# Patient Record
Sex: FEMALE | Race: WHITE | NOT HISPANIC OR LATINO | Employment: OTHER | URBAN - METROPOLITAN AREA
[De-identification: names, ages, dates, MRNs, and addresses within clinical notes are randomized per-mention and may not be internally consistent; named-entity substitution may affect disease eponyms.]

---

## 2023-03-29 LAB
ALANINE AMINOTRANSFERASE (SGPT) (U/L) IN SER/PLAS: 9 U/L (ref 7–45)
ALBUMIN (G/DL) IN SER/PLAS: 4.6 G/DL (ref 3.4–5)
ALKALINE PHOSPHATASE (U/L) IN SER/PLAS: 77 U/L (ref 33–136)
ANION GAP IN SER/PLAS: 13 MMOL/L (ref 10–20)
ASPARTATE AMINOTRANSFERASE (SGOT) (U/L) IN SER/PLAS: 11 U/L (ref 9–39)
BILIRUBIN TOTAL (MG/DL) IN SER/PLAS: 0.5 MG/DL (ref 0–1.2)
CALCIDIOL (25 OH VITAMIN D3) (NG/ML) IN SER/PLAS: 60 NG/ML
CALCIUM (MG/DL) IN SER/PLAS: 9.8 MG/DL (ref 8.6–10.3)
CARBON DIOXIDE, TOTAL (MMOL/L) IN SER/PLAS: 27 MMOL/L (ref 21–32)
CHLORIDE (MMOL/L) IN SER/PLAS: 105 MMOL/L (ref 98–107)
CHOLESTEROL (MG/DL) IN SER/PLAS: 185 MG/DL (ref 0–199)
CHOLESTEROL IN HDL (MG/DL) IN SER/PLAS: 69.3 MG/DL
CHOLESTEROL/HDL RATIO: 2.7
CREATININE (MG/DL) IN SER/PLAS: 1.09 MG/DL (ref 0.5–1.05)
ERYTHROCYTE DISTRIBUTION WIDTH (RATIO) BY AUTOMATED COUNT: 15.3 % (ref 11.5–14.5)
ERYTHROCYTE MEAN CORPUSCULAR HEMOGLOBIN CONCENTRATION (G/DL) BY AUTOMATED: 30.5 G/DL (ref 32–36)
ERYTHROCYTE MEAN CORPUSCULAR VOLUME (FL) BY AUTOMATED COUNT: 86 FL (ref 80–100)
ERYTHROCYTES (10*6/UL) IN BLOOD BY AUTOMATED COUNT: 5.13 X10E12/L (ref 4–5.2)
GFR FEMALE: 53 ML/MIN/1.73M2
GLUCOSE (MG/DL) IN SER/PLAS: 106 MG/DL (ref 74–99)
HEMATOCRIT (%) IN BLOOD BY AUTOMATED COUNT: 44 % (ref 36–46)
HEMOGLOBIN (G/DL) IN BLOOD: 13.4 G/DL (ref 12–16)
LDL: 101 MG/DL (ref 0–99)
LEUKOCYTES (10*3/UL) IN BLOOD BY AUTOMATED COUNT: 9.6 X10E9/L (ref 4.4–11.3)
NRBC (PER 100 WBCS) BY AUTOMATED COUNT: 0 /100 WBC (ref 0–0)
PLATELETS (10*3/UL) IN BLOOD AUTOMATED COUNT: 283 X10E9/L (ref 150–450)
POTASSIUM (MMOL/L) IN SER/PLAS: 4.6 MMOL/L (ref 3.5–5.3)
PROTEIN TOTAL: 7 G/DL (ref 6.4–8.2)
SODIUM (MMOL/L) IN SER/PLAS: 140 MMOL/L (ref 136–145)
THYROTROPIN (MIU/L) IN SER/PLAS BY DETECTION LIMIT <= 0.05 MIU/L: 2.91 MIU/L (ref 0.44–3.98)
TRIGLYCERIDE (MG/DL) IN SER/PLAS: 74 MG/DL (ref 0–149)
UREA NITROGEN (MG/DL) IN SER/PLAS: 15 MG/DL (ref 6–23)
VLDL: 15 MG/DL (ref 0–40)

## 2024-03-06 ENCOUNTER — HOSPITAL ENCOUNTER (OUTPATIENT)
Dept: RADIOLOGY | Facility: CLINIC | Age: 74
Discharge: HOME | End: 2024-03-06
Payer: MEDICARE

## 2024-03-06 DIAGNOSIS — F17.200 NICOTINE DEPENDENCE, UNSPECIFIED, UNCOMPLICATED: ICD-10-CM

## 2024-03-06 DIAGNOSIS — F17.210 CIGARETTE NICOTINE DEPENDENCE: ICD-10-CM

## 2024-03-06 PROCEDURE — 71271 CT THORAX LUNG CANCER SCR C-: CPT

## 2024-03-27 ENCOUNTER — OFFICE VISIT (OUTPATIENT)
Dept: OTOLARYNGOLOGY | Facility: CLINIC | Age: 74
End: 2024-03-27
Payer: MEDICARE

## 2024-03-27 VITALS — BODY MASS INDEX: 27 KG/M2 | WEIGHT: 150 LBS

## 2024-03-27 DIAGNOSIS — H83.3X2 NOISE-INDUCED HEARING LOSS OF LEFT EAR WITH RESTRICTED HEARING OF RIGHT EAR: Primary | ICD-10-CM

## 2024-03-27 DIAGNOSIS — H93.19 TINNITUS, UNSPECIFIED LATERALITY: ICD-10-CM

## 2024-03-27 PROCEDURE — 99203 OFFICE O/P NEW LOW 30 MIN: CPT | Performed by: GENERAL PRACTICE

## 2024-03-27 PROCEDURE — 4004F PT TOBACCO SCREEN RCVD TLK: CPT | Performed by: GENERAL PRACTICE

## 2024-03-27 PROCEDURE — 1159F MED LIST DOCD IN RCRD: CPT | Performed by: GENERAL PRACTICE

## 2024-03-27 RX ORDER — ATORVASTATIN CALCIUM 40 MG/1
40 TABLET, FILM COATED ORAL DAILY
COMMUNITY
Start: 2020-09-21

## 2024-03-27 RX ORDER — SOLIFENACIN SUCCINATE 5 MG/1
5 TABLET, FILM COATED ORAL DAILY
COMMUNITY
Start: 2024-02-29

## 2024-03-27 RX ORDER — ALBUTEROL SULFATE 90 UG/1
1 AEROSOL, METERED RESPIRATORY (INHALATION) EVERY 4 HOURS PRN
COMMUNITY

## 2024-03-27 RX ORDER — VIT C/E/ZN/COPPR/LUTEIN/ZEAXAN 250MG-90MG
25 CAPSULE ORAL DAILY
COMMUNITY
Start: 2022-07-22

## 2024-03-27 RX ORDER — FLUTICASONE PROPIONATE 50 MCG
SPRAY, SUSPENSION (ML) NASAL EVERY 12 HOURS
COMMUNITY
End: 2024-04-17 | Stop reason: ALTCHOICE

## 2024-03-27 RX ORDER — NAPROXEN SODIUM 220 MG/1
TABLET, FILM COATED ORAL
COMMUNITY
End: 2024-04-17 | Stop reason: ALTCHOICE

## 2024-03-27 NOTE — PROGRESS NOTES
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  Symptomatically improving.  No otitis on exam.  Discussed audio to eval.  Discussed possible steroid treatment if symptoms were not improving but pt declines for now.   Consider MRI of IACs for asymmetry depending on audio results.       Follow up:  -plan for follow up in clinic as needed and after completion of ordered studies    All of the above findings, impressions, treatment planning and follow up plans were discussed with the patient who indicated understanding.  the patient was instructed to contact or return to clinic sooner if symptoms/signs persist or worsen despite the above management.      Heri Pablo MD  Otolaryngology - Head and Neck Surgery            History Of Present Illness  Genevieve Parham is a 74 y.o. female presenting for concerns of left sided hearing change and tinnitus.     Reprots some baseline L>R hearing loss with audiogram at outside facility last year showing L>R hearing loss.  Hearing aids used briefly but poor performance and they were returned.    Reports recent noise exposure (music) on 3/18 with some L>R muffled hearing and nonpulsatile tinnitus since this time.  Improvement but not resolution since initially noted.    No significant history of recurrent otitis or ETD symptoms.     Reports a history of allergic rhinitis, none currently.   Reports some chronic sinus pressure/congestion and prior FESS x3, but no recurrent acute sinusitis in recent years.     Notes some history of mild  lightheadedness with rising rapidly, but no other significant vertigo.             Past Medical History  She has a past medical history of Personal history of other diseases of the circulatory system and Personal history of other medical treatment.    Surgical History  She has a past surgical history that includes Other surgical history (07/01/2019).     Social History  She reports that she has been smoking cigarettes. She has been smoking an average of 1 pack per day. She has never used smokeless tobacco. No history on file for alcohol use and drug use.    Family History  No family history on file.     Allergies  Patient has no known allergies.    Review of Systems  ROS: Pertinent positives as noted in HPI.    - CONSTITUTIONAL: Does not report weight loss, fever or chills.    - HEENT:   Ear: Does not report  ,  ,    , otalgia, otorrhea  Nose: Does not report congestion, rhinorrhea, epistaxis, decreased smell  Throat: Does not report pain, dysphagia, odynophagia  Larynx: Does not report hoarseness,  difficulty breathing, pain with speaking (odynophonia)  Neck: Does not report new masses, pain, swelling  Face: Does not report sinus pain, pressure, swelling, numbness, weakness     - RESPIRATORY: Does not report SOB or cough.    - CV: Does not report palpitations or chest pain.     - GI: Does not report abdominal pain, nausea, vomiting or diarrhea.    - : Does not report dysuria or urinary frequency.    - MSK: Does not report myalgia or joint pain.    - SKIN: Does not report rash or pruritus.    - NEUROLOGICAL: Does not report headache or syncope.    - PSYCHIATRIC: Does not report recent changes in mood. Does not report anxiety or depression.         Physical Exam:     GENERAL:   Alert & Oriented to person, place and time; Normal affect and appearance. Well developed and well nourished. Conversant & cooperative with examination.     HEAD:   Normocephalic, atraumatic. No sinus tenderness to palpation. Normal parotid  bilaterally. Normal facial strength.     NEUROLOGIC:   Cranial nerves II-XII grossly intact, gait WNL. Normal mood and affect.    EYES:   Extraocular movements intact. Pupils equal, round, reactive to light and accommodation. No nystagmus, no ptosis. no scleral injection.    EAR:   Normal auricle. No discomfort or TTP with manipulation.   Handheld otoscopic exam showed normal external auditory canals bilaterally. No purulence or EAC inflammation. Minimal cerumen.   Right tympanic membrane clear and mobile without evidence of perforation, retraction or middle ear effusion.   Left tympanic membrane clear and mobile without evidence of perforation, retraction or middle ear effusion.   Singh to R with 512hz and AC>BC b/l.       NOSE:   No external deformity. No external nasal lesions, lacerations, or scars. Nasal tip symmetrical with normal nasal valves.   Nasal cavity with essentially midline septum, normal mucosa and turbinates. No lesions, masses, purulence or polyps.     OC/OP:   Mucous membranes moist, no masses, lesions or exudates.   Normal tongue, floor of mouth, teeth, gums, lips. Normal posterior pharyngeal wall.    Normal tonsils without erythema, exudate or obvious calculi     NECK:   No neck masses or thyroid enlargement. Trachea midline. No tenderness to palpation    LYMPHATIC:   No cervical lymphadenopathy.     RESPIRATORY:   Symmetric chest elevation & no retractions. No significant hoarseness. No increased work of breathing.    CV:   No clubbing or cyanosis. No obvious edema    Skin:   No facial rashes, vesicles or lesions.     Extremities:   No gross abnormalities      Clinic Procedure        Information review:  External sources (notes, imaging, lab results) listed below personally reviewed to aid in medical decision making process.  -  -  -

## 2024-04-17 ENCOUNTER — APPOINTMENT (OUTPATIENT)
Dept: RADIOLOGY | Facility: HOSPITAL | Age: 74
End: 2024-04-17
Payer: MEDICARE

## 2024-04-17 ENCOUNTER — APPOINTMENT (OUTPATIENT)
Dept: CARDIOLOGY | Facility: HOSPITAL | Age: 74
End: 2024-04-17
Payer: MEDICARE

## 2024-04-17 ENCOUNTER — HOSPITAL ENCOUNTER (OUTPATIENT)
Facility: HOSPITAL | Age: 74
Setting detail: OBSERVATION
Discharge: HOME | End: 2024-04-18
Attending: STUDENT IN AN ORGANIZED HEALTH CARE EDUCATION/TRAINING PROGRAM | Admitting: INTERNAL MEDICINE
Payer: MEDICARE

## 2024-04-17 DIAGNOSIS — R07.9 CHEST PAIN, UNSPECIFIED TYPE: Primary | ICD-10-CM

## 2024-04-17 LAB
ALBUMIN SERPL BCP-MCNC: 4.3 G/DL (ref 3.4–5)
ALP SERPL-CCNC: 86 U/L (ref 33–136)
ALT SERPL W P-5'-P-CCNC: 9 U/L (ref 7–45)
ANION GAP BLDV CALCULATED.4IONS-SCNC: 12 MMOL/L (ref 10–25)
ANION GAP SERPL CALC-SCNC: 14 MMOL/L (ref 10–20)
AST SERPL W P-5'-P-CCNC: 11 U/L (ref 9–39)
BASE EXCESS BLDV CALC-SCNC: 1.1 MMOL/L (ref -2–3)
BASOPHILS # BLD AUTO: 0.08 X10*3/UL (ref 0–0.1)
BASOPHILS NFR BLD AUTO: 0.7 %
BILIRUB SERPL-MCNC: 0.6 MG/DL (ref 0–1.2)
BODY TEMPERATURE: 37 DEGREES CELSIUS
BUN SERPL-MCNC: 12 MG/DL (ref 6–23)
CA-I BLDV-SCNC: 1.23 MMOL/L (ref 1.1–1.33)
CALCIUM SERPL-MCNC: 9.9 MG/DL (ref 8.6–10.3)
CARDIAC TROPONIN I PNL SERPL HS: 5 NG/L (ref 0–13)
CARDIAC TROPONIN I PNL SERPL HS: 5 NG/L (ref 0–13)
CHLORIDE BLDV-SCNC: 103 MMOL/L (ref 98–107)
CHLORIDE SERPL-SCNC: 104 MMOL/L (ref 98–107)
CO2 SERPL-SCNC: 27 MMOL/L (ref 21–32)
CREAT SERPL-MCNC: 1.07 MG/DL (ref 0.5–1.05)
EGFRCR SERPLBLD CKD-EPI 2021: 54 ML/MIN/1.73M*2
EOSINOPHIL # BLD AUTO: 0.16 X10*3/UL (ref 0–0.4)
EOSINOPHIL NFR BLD AUTO: 1.5 %
ERYTHROCYTE [DISTWIDTH] IN BLOOD BY AUTOMATED COUNT: 13.8 % (ref 11.5–14.5)
FLUAV RNA RESP QL NAA+PROBE: NOT DETECTED
FLUBV RNA RESP QL NAA+PROBE: NOT DETECTED
GLUCOSE BLDV-MCNC: 105 MG/DL (ref 74–99)
GLUCOSE SERPL-MCNC: 101 MG/DL (ref 74–99)
HCO3 BLDV-SCNC: 27.6 MMOL/L (ref 22–26)
HCT VFR BLD AUTO: 45.4 % (ref 36–46)
HCT VFR BLD EST: 45 % (ref 36–46)
HGB BLD-MCNC: 15 G/DL (ref 12–16)
HGB BLDV-MCNC: 15 G/DL (ref 12–16)
IMM GRANULOCYTES # BLD AUTO: 0.03 X10*3/UL (ref 0–0.5)
IMM GRANULOCYTES NFR BLD AUTO: 0.3 % (ref 0–0.9)
INHALED O2 CONCENTRATION: 21 %
LACTATE BLDV-SCNC: 2.2 MMOL/L (ref 0.4–2)
LYMPHOCYTES # BLD AUTO: 2.97 X10*3/UL (ref 0.8–3)
LYMPHOCYTES NFR BLD AUTO: 27 %
MAGNESIUM SERPL-MCNC: 1.88 MG/DL (ref 1.6–2.4)
MCH RBC QN AUTO: 29.2 PG (ref 26–34)
MCHC RBC AUTO-ENTMCNC: 33 G/DL (ref 32–36)
MCV RBC AUTO: 89 FL (ref 80–100)
MONOCYTES # BLD AUTO: 0.86 X10*3/UL (ref 0.05–0.8)
MONOCYTES NFR BLD AUTO: 7.8 %
NEUTROPHILS # BLD AUTO: 6.91 X10*3/UL (ref 1.6–5.5)
NEUTROPHILS NFR BLD AUTO: 62.7 %
NRBC BLD-RTO: 0 /100 WBCS (ref 0–0)
OXYHGB MFR BLDV: 51.4 % (ref 45–75)
PCO2 BLDV: 50 MM HG (ref 41–51)
PH BLDV: 7.35 PH (ref 7.33–7.43)
PLATELET # BLD AUTO: 270 X10*3/UL (ref 150–450)
PO2 BLDV: 34 MM HG (ref 35–45)
POTASSIUM BLDV-SCNC: 4.3 MMOL/L (ref 3.5–5.3)
POTASSIUM SERPL-SCNC: 4.1 MMOL/L (ref 3.5–5.3)
PROT SERPL-MCNC: 6.9 G/DL (ref 6.4–8.2)
RBC # BLD AUTO: 5.13 X10*6/UL (ref 4–5.2)
SAO2 % BLDV: 56 % (ref 45–75)
SARS-COV-2 RNA RESP QL NAA+PROBE: NOT DETECTED
SODIUM BLDV-SCNC: 138 MMOL/L (ref 136–145)
SODIUM SERPL-SCNC: 141 MMOL/L (ref 136–145)
WBC # BLD AUTO: 11 X10*3/UL (ref 4.4–11.3)

## 2024-04-17 PROCEDURE — 84132 ASSAY OF SERUM POTASSIUM: CPT | Performed by: STUDENT IN AN ORGANIZED HEALTH CARE EDUCATION/TRAINING PROGRAM

## 2024-04-17 PROCEDURE — 2500000001 HC RX 250 WO HCPCS SELF ADMINISTERED DRUGS (ALT 637 FOR MEDICARE OP): Performed by: STUDENT IN AN ORGANIZED HEALTH CARE EDUCATION/TRAINING PROGRAM

## 2024-04-17 PROCEDURE — 84484 ASSAY OF TROPONIN QUANT: CPT | Performed by: STUDENT IN AN ORGANIZED HEALTH CARE EDUCATION/TRAINING PROGRAM

## 2024-04-17 PROCEDURE — 93005 ELECTROCARDIOGRAM TRACING: CPT

## 2024-04-17 PROCEDURE — 99285 EMERGENCY DEPT VISIT HI MDM: CPT | Mod: 25

## 2024-04-17 PROCEDURE — 2500000001 HC RX 250 WO HCPCS SELF ADMINISTERED DRUGS (ALT 637 FOR MEDICARE OP)

## 2024-04-17 PROCEDURE — 87636 SARSCOV2 & INF A&B AMP PRB: CPT | Performed by: STUDENT IN AN ORGANIZED HEALTH CARE EDUCATION/TRAINING PROGRAM

## 2024-04-17 PROCEDURE — 36415 COLL VENOUS BLD VENIPUNCTURE: CPT | Performed by: STUDENT IN AN ORGANIZED HEALTH CARE EDUCATION/TRAINING PROGRAM

## 2024-04-17 PROCEDURE — G0378 HOSPITAL OBSERVATION PER HR: HCPCS

## 2024-04-17 PROCEDURE — 71045 X-RAY EXAM CHEST 1 VIEW: CPT | Performed by: RADIOLOGY

## 2024-04-17 PROCEDURE — 2550000001 HC RX 255 CONTRASTS: Performed by: STUDENT IN AN ORGANIZED HEALTH CARE EDUCATION/TRAINING PROGRAM

## 2024-04-17 PROCEDURE — 2500000006 HC RX 250 W HCPCS SELF ADMINISTERED DRUGS (ALT 637 FOR ALL PAYERS)

## 2024-04-17 PROCEDURE — 93010 ELECTROCARDIOGRAM REPORT: CPT | Performed by: INTERNAL MEDICINE

## 2024-04-17 PROCEDURE — 71275 CT ANGIOGRAPHY CHEST: CPT | Performed by: RADIOLOGY

## 2024-04-17 PROCEDURE — 74174 CTA ABD&PLVS W/CONTRAST: CPT

## 2024-04-17 PROCEDURE — 83735 ASSAY OF MAGNESIUM: CPT | Performed by: STUDENT IN AN ORGANIZED HEALTH CARE EDUCATION/TRAINING PROGRAM

## 2024-04-17 PROCEDURE — 2500000006 HC RX 250 W HCPCS SELF ADMINISTERED DRUGS (ALT 637 FOR ALL PAYERS): Mod: MUE

## 2024-04-17 PROCEDURE — 85025 COMPLETE CBC W/AUTO DIFF WBC: CPT | Performed by: STUDENT IN AN ORGANIZED HEALTH CARE EDUCATION/TRAINING PROGRAM

## 2024-04-17 PROCEDURE — 74174 CTA ABD&PLVS W/CONTRAST: CPT | Performed by: RADIOLOGY

## 2024-04-17 PROCEDURE — 71045 X-RAY EXAM CHEST 1 VIEW: CPT

## 2024-04-17 PROCEDURE — 84132 ASSAY OF SERUM POTASSIUM: CPT | Mod: 59,91 | Performed by: STUDENT IN AN ORGANIZED HEALTH CARE EDUCATION/TRAINING PROGRAM

## 2024-04-17 RX ORDER — NAPROXEN SODIUM 220 MG/1
324 TABLET, FILM COATED ORAL ONCE
Status: COMPLETED | OUTPATIENT
Start: 2024-04-17 | End: 2024-04-17

## 2024-04-17 RX ORDER — OXYBUTYNIN CHLORIDE 5 MG/1
5 TABLET ORAL 2 TIMES DAILY
Status: DISCONTINUED | OUTPATIENT
Start: 2024-04-17 | End: 2024-04-18 | Stop reason: HOSPADM

## 2024-04-17 RX ORDER — NAPROXEN SODIUM 220 MG/1
81 TABLET, FILM COATED ORAL DAILY
Status: DISCONTINUED | OUTPATIENT
Start: 2024-04-18 | End: 2024-04-18 | Stop reason: HOSPADM

## 2024-04-17 RX ORDER — CLOPIDOGREL BISULFATE 75 MG/1
75 TABLET ORAL DAILY
COMMUNITY

## 2024-04-17 RX ORDER — ATORVASTATIN CALCIUM 40 MG/1
40 TABLET, FILM COATED ORAL NIGHTLY
Status: DISCONTINUED | OUTPATIENT
Start: 2024-04-17 | End: 2024-04-18 | Stop reason: HOSPADM

## 2024-04-17 RX ORDER — ENOXAPARIN SODIUM 100 MG/ML
40 INJECTION SUBCUTANEOUS EVERY 24 HOURS
Status: DISCONTINUED | OUTPATIENT
Start: 2024-04-17 | End: 2024-04-18 | Stop reason: HOSPADM

## 2024-04-17 RX ORDER — ONDANSETRON HYDROCHLORIDE 2 MG/ML
4 INJECTION, SOLUTION INTRAVENOUS ONCE
Status: DISCONTINUED | OUTPATIENT
Start: 2024-04-17 | End: 2024-04-18 | Stop reason: HOSPADM

## 2024-04-17 RX ORDER — TALC
6 POWDER (GRAM) TOPICAL NIGHTLY
Status: DISCONTINUED | OUTPATIENT
Start: 2024-04-17 | End: 2024-04-18 | Stop reason: HOSPADM

## 2024-04-17 RX ORDER — LANOLIN ALCOHOL/MO/W.PET/CERES
1000 CREAM (GRAM) TOPICAL DAILY
COMMUNITY

## 2024-04-17 RX ORDER — PANTOPRAZOLE SODIUM 40 MG/1
40 TABLET, DELAYED RELEASE ORAL
COMMUNITY

## 2024-04-17 RX ADMIN — ATORVASTATIN CALCIUM 40 MG: 40 TABLET, FILM COATED ORAL at 20:14

## 2024-04-17 RX ADMIN — ASPIRIN 81 MG CHEWABLE TABLET 324 MG: 81 TABLET CHEWABLE at 12:47

## 2024-04-17 RX ADMIN — IOHEXOL 75 ML: 350 INJECTION, SOLUTION INTRAVENOUS at 10:49

## 2024-04-17 RX ADMIN — OXYBUTYNIN CHLORIDE 5 MG: 5 TABLET ORAL at 20:14

## 2024-04-17 SDOH — SOCIAL STABILITY: SOCIAL INSECURITY: HAVE YOU HAD ANY THOUGHTS OF HARMING ANYONE ELSE?: NO

## 2024-04-17 SDOH — SOCIAL STABILITY: SOCIAL INSECURITY: HAVE YOU HAD THOUGHTS OF HARMING ANYONE ELSE?: NO

## 2024-04-17 SDOH — SOCIAL STABILITY: SOCIAL INSECURITY: DO YOU FEEL ANYONE HAS EXPLOITED OR TAKEN ADVANTAGE OF YOU FINANCIALLY OR OF YOUR PERSONAL PROPERTY?: NO

## 2024-04-17 SDOH — SOCIAL STABILITY: SOCIAL INSECURITY: WERE YOU ABLE TO COMPLETE ALL THE BEHAVIORAL HEALTH SCREENINGS?: YES

## 2024-04-17 SDOH — SOCIAL STABILITY: SOCIAL INSECURITY: ARE THERE ANY APPARENT SIGNS OF INJURIES/BEHAVIORS THAT COULD BE RELATED TO ABUSE/NEGLECT?: NO

## 2024-04-17 SDOH — SOCIAL STABILITY: SOCIAL INSECURITY: DOES ANYONE TRY TO KEEP YOU FROM HAVING/CONTACTING OTHER FRIENDS OR DOING THINGS OUTSIDE YOUR HOME?: NO

## 2024-04-17 SDOH — SOCIAL STABILITY: SOCIAL INSECURITY: DO YOU FEEL UNSAFE GOING BACK TO THE PLACE WHERE YOU ARE LIVING?: NO

## 2024-04-17 SDOH — SOCIAL STABILITY: SOCIAL INSECURITY: ABUSE: ADULT

## 2024-04-17 SDOH — SOCIAL STABILITY: SOCIAL INSECURITY: HAS ANYONE EVER THREATENED TO HURT YOUR FAMILY OR YOUR PETS?: NO

## 2024-04-17 SDOH — SOCIAL STABILITY: SOCIAL INSECURITY: ARE YOU OR HAVE YOU BEEN THREATENED OR ABUSED PHYSICALLY, EMOTIONALLY, OR SEXUALLY BY ANYONE?: NO

## 2024-04-17 ASSESSMENT — COGNITIVE AND FUNCTIONAL STATUS - GENERAL
WALKING IN HOSPITAL ROOM: A LITTLE
PATIENT BASELINE BEDBOUND: NO
DAILY ACTIVITIY SCORE: 24
MOBILITY SCORE: 22
CLIMB 3 TO 5 STEPS WITH RAILING: A LITTLE

## 2024-04-17 ASSESSMENT — ACTIVITIES OF DAILY LIVING (ADL)
LACK_OF_TRANSPORTATION: NO
TOILETING: INDEPENDENT
BATHING: INDEPENDENT
DRESSING YOURSELF: INDEPENDENT
HEARING - RIGHT EAR: FUNCTIONAL
ADEQUATE_TO_COMPLETE_ADL: YES
JUDGMENT_ADEQUATE_SAFELY_COMPLETE_DAILY_ACTIVITIES: YES
HEARING - LEFT EAR: FUNCTIONAL
WALKS IN HOME: INDEPENDENT
PATIENT'S MEMORY ADEQUATE TO SAFELY COMPLETE DAILY ACTIVITIES?: YES
FEEDING YOURSELF: INDEPENDENT
GROOMING: INDEPENDENT

## 2024-04-17 ASSESSMENT — PAIN - FUNCTIONAL ASSESSMENT
PAIN_FUNCTIONAL_ASSESSMENT: 0-10
PAIN_FUNCTIONAL_ASSESSMENT: 0-10

## 2024-04-17 ASSESSMENT — PATIENT HEALTH QUESTIONNAIRE - PHQ9
2. FEELING DOWN, DEPRESSED OR HOPELESS: NOT AT ALL
1. LITTLE INTEREST OR PLEASURE IN DOING THINGS: NOT AT ALL
SUM OF ALL RESPONSES TO PHQ9 QUESTIONS 1 & 2: 0

## 2024-04-17 ASSESSMENT — LIFESTYLE VARIABLES
HOW MANY STANDARD DRINKS CONTAINING ALCOHOL DO YOU HAVE ON A TYPICAL DAY: PATIENT DOES NOT DRINK
AUDIT-C TOTAL SCORE: 0
PRESCIPTION_ABUSE_PAST_12_MONTHS: NO
SKIP TO QUESTIONS 9-10: 1
HOW OFTEN DO YOU HAVE A DRINK CONTAINING ALCOHOL: NEVER
HOW OFTEN DO YOU HAVE 6 OR MORE DRINKS ON ONE OCCASION: NEVER
SUBSTANCE_ABUSE_PAST_12_MONTHS: NO
AUDIT-C TOTAL SCORE: 0

## 2024-04-17 ASSESSMENT — COLUMBIA-SUICIDE SEVERITY RATING SCALE - C-SSRS
1. IN THE PAST MONTH, HAVE YOU WISHED YOU WERE DEAD OR WISHED YOU COULD GO TO SLEEP AND NOT WAKE UP?: NO
6. HAVE YOU EVER DONE ANYTHING, STARTED TO DO ANYTHING, OR PREPARED TO DO ANYTHING TO END YOUR LIFE?: NO
2. HAVE YOU ACTUALLY HAD ANY THOUGHTS OF KILLING YOURSELF?: NO

## 2024-04-17 ASSESSMENT — PAIN SCALES - GENERAL
PAINLEVEL_OUTOF10: 2
PAINLEVEL_OUTOF10: 1
PAINLEVEL_OUTOF10: 0 - NO PAIN
PAINLEVEL_OUTOF10: 0 - NO PAIN

## 2024-04-17 ASSESSMENT — PAIN DESCRIPTION - LOCATION
LOCATION: CHEST
LOCATION: CHEST

## 2024-04-17 NOTE — ED PROVIDER NOTES
HPI   Chief Complaint   Patient presents with    Chest Pain       This is a 74-year-old female with past medical history that includes CAD status post 3 stents presenting to the emergency department for chest pain.  Patient was in the waiting room after bringing a friend to the emergency department when she started developing chest pain.  States this was pressure/crampy pain sternally.  She felt lightheaded and fainted.  Patient was brought back in a wheelchair and had another episode of fainting.  She currently feels nauseous though the chest pain has started to improve.  States she has had episodes like this in the past and never lost consciousness.  She normally takes nitro at home which causes the pain to go away.  She otherwise denies any recent symptoms including headaches, vision changes, shortness of breath, abdominal pain, back pain, urinary symptoms, lower extremity pain or swelling.      History provided by:  Patient   used: No                        Awa Coma Scale Score: 15                     Patient History   Past Medical History:   Diagnosis Date    Personal history of other diseases of the circulatory system     History of hypertension    Personal history of other medical treatment     History of echocardiogram     Past Surgical History:   Procedure Laterality Date    OTHER SURGICAL HISTORY  07/01/2019    Cardiac catheterization with stent placement     No family history on file.  Social History     Tobacco Use    Smoking status: Every Day     Current packs/day: 1.00     Types: Cigarettes    Smokeless tobacco: Never   Substance Use Topics    Alcohol use: Not on file    Drug use: Not on file       Physical Exam   ED Triage Vitals [04/17/24 1021]   Temp Heart Rate Resp BP   -- 74 -- 155/76      Pulse Ox Temp src Heart Rate Source Patient Position   99 % -- -- Sitting      BP Location FiO2 (%)     Right arm --       Physical Exam  GEN: Uncomfortable, diaphoretic  CVS/CHEST: reg  rate, nl rhythm, no murmurs/gallops/rubs  PULM: CTAB b/l no wheezes, crackles, or rhonchi   GI: NT/ND, no masses or organomegaly, soft, no guarding  BACK: no CVA tenderness, no vertebral point tenderness  EXT: no LE edema, 2+ periph pulses in bilat radial and DP   NEURO:  no focal deficits, no facial asymmetry, moving all extremities, 5/5 Strength in bicep/tricep/hip flexor/plantar flexion. Sensation over these muscle groups intact.   PSYCH: AAOx3 answers questions appropriately  ED Course & MDM   Diagnoses as of 04/17/24 1232   Chest pain, unspecified type       Medical Decision Making  This is a 74-year-old female with past medical history that includes CAD status post 3 stents presenting to the emergency department for chest pain.  Patient brought back emergently from triage after her 2 episodes of fainting.  On my exam she is uncomfortable and diaphoretic appearing.  Patient initially had an episode of dry heaving without any vomiting.  After this episode stated her chest pain and nausea are improving.  Lungs are clear, abdomen is nontender.  With her history initial concern is ACS.  We will obtain imaging to evaluate for dissection/AAA with her episode of syncope as well.  She has not had any recent infectious symptoms.  Low concern for pneumothorax.  Will hold on aspirin pending CTA.    EKG as interpreted by me: Sinus rhythm at 74 bpm, normal axis, intervals unremarkable, no significant ST elevations or depressions    Patient's lab work largely unremarkable including negative troponin x 2.  CT angio with incidental finding of pulmonary nodule but no signs of aortic pathology or intrathoracic cause of patient's presentation.  Patient treated with aspirin in the emergency department.  Upon reassessment she endorsed resolution of her symptoms.  Given her concerning presentation as well as cardiac history I do feel she would benefit from further monitoring and management.  Patient discussed with Dr. Rojo who will  admit.  Procedure  Procedures     Prosper Guthrie MD  04/17/24 0853

## 2024-04-17 NOTE — H&P
"History Of Present Illness  Primary Care Physician: Dr. Eze Rojo  Subspeciality Physicians: Dr. Caputo (Cardiology)    Chief Complaint: Chest pain and syncopal/presyncopal episode    History of Presenting Illness:  Genevieve Parham is a 74 y.o. female with a past medical history of CAD with history of inferior STEMI (s/p PCI 2016), GERD, GI bleed, COPD (not on home oxygen) and history of COVID-pneumonia.  Patient had a syncopal episode worked up in 2022.  Patient says that she was feeling well when she woke up this morning without any concerns.  Her friend was feeling unwell and asked her to take her to the emergency department while waiting for her friend in the ER waiting room patient began to feel quite nauseous and there is an associated pain in the center of her chest with discomfort associated with \"swallowing air\".  Patient says that during this episode of nausea and chest discomfort she began to feel lightheaded unsure if she completely lost consciousness but had 2 such episodes.  She said this did happen back in 2022 when she had her MI but not since then.  Of note patient does have GERD chronically on PPI that she says has been less effective over the last couple months with persistent GERD symptoms throughout the day.  Patient says her symptoms have improved while she was in the emergency room and she currently feels better.    ED Interventions and results:  Patient's blood pressure 155/76 heart rate of 74 saturating 99% on room air.  Patient's creatinine baseline 0.8-0.9.  Patient's CBC unremarkable.  Patient's CT chest demonstrated mild atherosclerotic disease, emphysematous changes secondary to smoking findings suggestive of pulmonary hypertension possible atypical infection.  Mild lymphadenopathy, 3 mm right lung nodule.  Nodule in patient's left thyroid lobe.  3.1 cm infrarenal abdominal aortic aneurysm she is feeling a lot better with right common iliac artery ectasia.  Patient's EKG " unremarkable sinus rhythm.  Patient's troponin was negative x 2.  Patient was admitted to medicine service for evaluation of syncope.    Comprehensive 10-point review of systems negative otherwise as noted above in HPI    Diagnoses: Suspect vasovagal syncope in the setting of GERD with associated chest discomfort     Past Medical History  She has a past medical history of Personal history of other diseases of the circulatory system and Personal history of other medical treatment.    Surgical History  She has a past surgical history that includes Other surgical history (07/01/2019).     Social History  She reports that she has been smoking cigarettes. She has never used smokeless tobacco. No history on file for alcohol use and drug use.    Family History  No family history on file.     Allergies  Patient has no known allergies.    Review of Systems   Comprehensive 10-point review of systems negative otherwise as noted above in HPI    Physical Exam    Constitutional: Well developed,  no distress, alert and cooperative  Respiratory/Thorax: Patent airways, CTAB, patient has pain on palpation of anterior chest wall in line with her sternum  Cardiovascular: Regular, rate and rhythm, normal S 1and S 2  Gastrointestinal: Nondistended, soft, non-tender,   Neurological: alert and oriented x3,   MSK: Full range of motion, no tenderness  Psychological: Appropriate mood and behavior  Skin: Warm and dry  Last Recorded Vitals  /76 (BP Location: Right arm, Patient Position: Sitting)   Pulse 74   Wt 61.2 kg (135 lb)   SpO2 99%     Relevant Results        Assessment/Plan   Principal Problem:    Chest pain, unspecified type    Admission Details  Patient is a 74-year-old female with a history of CAD who presents with syncopal episode and chest pain.    Acute Medical Conditions  #Suspected vasovagal syncope/presyncope  #Chest pain in setting of above  #Uncontrolled GERD precipitating above  #Nausea in setting of  above.  Patient's presented with progressive GERD symptoms over the last several months nausea, central chest discomfort with dysphagia sensation.  Her nauseous feeling in the setting with subsequent presyncopal/syncopal symptoms are likely secondary to a vasovagal syncope.  -Considering patient's history is largely suggestive of vasovagal syncope she may not require echocardiogram, will defer to cardiology team.  -Orthostatic  vitals  -40 mg Protonix twice daily  -Maalox  -Will monitor on telemetry  -Zofran as needed patient's QTc appropriate.  -Consult cardiology, syncope and chest pain in the setting of significant CAD.    Chronic Medical Conditions  #CAD-continue patient's home aspirin  #COPD-not on home oxygen  #Hyperlipidemia-continue atorvastatin  #Urinary frequency-continue Vesicare substitute oxybutynin while in hospital    Incidental Findings  -Lung nodule 3 mm requiring follow-up considering patient has high risk for primary lung malignancy    DVT: Enoxaparin  Diet: Cardiac diet  Fluids: 1 L LR  Electrolytes: Will replete as needed  Dispo: Telemetry  Code Status: Confirmed DNR/DNI at the bedside  Consults: Cardiology  Antibiotics: None    Dr. Mary Anne Riley   Internal Medicine PGY-2  Available on [employer] for any questions.    This is a preliminary note pending signature from the attending physician.  Mary Anne Riley MD

## 2024-04-18 VITALS
WEIGHT: 135 LBS | SYSTOLIC BLOOD PRESSURE: 127 MMHG | BODY MASS INDEX: 23.92 KG/M2 | OXYGEN SATURATION: 95 % | HEART RATE: 71 BPM | HEIGHT: 63 IN | RESPIRATION RATE: 16 BRPM | TEMPERATURE: 97.3 F | DIASTOLIC BLOOD PRESSURE: 65 MMHG

## 2024-04-18 LAB
ANION GAP SERPL CALC-SCNC: 13 MMOL/L (ref 10–20)
ATRIAL RATE: 74 BPM
BUN SERPL-MCNC: 11 MG/DL (ref 6–23)
CALCIUM SERPL-MCNC: 9 MG/DL (ref 8.6–10.3)
CHLORIDE SERPL-SCNC: 107 MMOL/L (ref 98–107)
CO2 SERPL-SCNC: 24 MMOL/L (ref 21–32)
CREAT SERPL-MCNC: 0.81 MG/DL (ref 0.5–1.05)
EGFRCR SERPLBLD CKD-EPI 2021: 76 ML/MIN/1.73M*2
GLUCOSE SERPL-MCNC: 89 MG/DL (ref 74–99)
HOLD SPECIMEN: NORMAL
HOLD SPECIMEN: NORMAL
P AXIS: 79 DEGREES
P OFFSET: 198 MS
P ONSET: 148 MS
POTASSIUM SERPL-SCNC: 4 MMOL/L (ref 3.5–5.3)
PR INTERVAL: 146 MS
Q ONSET: 221 MS
QRS COUNT: 12 BEATS
QRS DURATION: 68 MS
QT INTERVAL: 398 MS
QTC CALCULATION(BAZETT): 441 MS
QTC FREDERICIA: 427 MS
R AXIS: 75 DEGREES
SODIUM SERPL-SCNC: 140 MMOL/L (ref 136–145)
T AXIS: 73 DEGREES
T OFFSET: 420 MS
VENTRICULAR RATE: 74 BPM

## 2024-04-18 PROCEDURE — 2500000006 HC RX 250 W HCPCS SELF ADMINISTERED DRUGS (ALT 637 FOR ALL PAYERS)

## 2024-04-18 PROCEDURE — G0378 HOSPITAL OBSERVATION PER HR: HCPCS

## 2024-04-18 PROCEDURE — 2500000005 HC RX 250 GENERAL PHARMACY W/O HCPCS

## 2024-04-18 PROCEDURE — 36415 COLL VENOUS BLD VENIPUNCTURE: CPT

## 2024-04-18 PROCEDURE — 82374 ASSAY BLOOD CARBON DIOXIDE: CPT

## 2024-04-18 PROCEDURE — 2500000001 HC RX 250 WO HCPCS SELF ADMINISTERED DRUGS (ALT 637 FOR MEDICARE OP)

## 2024-04-18 PROCEDURE — 2500000006 HC RX 250 W HCPCS SELF ADMINISTERED DRUGS (ALT 637 FOR ALL PAYERS): Mod: MUE

## 2024-04-18 PROCEDURE — 99222 1ST HOSP IP/OBS MODERATE 55: CPT | Performed by: INTERNAL MEDICINE

## 2024-04-18 RX ORDER — PANTOPRAZOLE SODIUM 40 MG/1
40 TABLET, DELAYED RELEASE ORAL
Status: DISCONTINUED | OUTPATIENT
Start: 2024-04-18 | End: 2024-04-18 | Stop reason: HOSPADM

## 2024-04-18 RX ADMIN — PANTOPRAZOLE SODIUM 40 MG: 40 TABLET, DELAYED RELEASE ORAL at 09:20

## 2024-04-18 RX ADMIN — DIPHENHYDRAMINE HYDROCHLORIDE AND LIDOCAINE HYDROCHLORIDE AND ALUMINUM HYDROXIDE AND MAGNESIUM HYDRO 10 ML: KIT at 11:25

## 2024-04-18 RX ADMIN — OXYBUTYNIN CHLORIDE 5 MG: 5 TABLET ORAL at 09:20

## 2024-04-18 RX ADMIN — ASPIRIN 81 MG CHEWABLE TABLET 81 MG: 81 TABLET CHEWABLE at 09:20

## 2024-04-18 ASSESSMENT — PAIN SCALES - GENERAL: PAINLEVEL_OUTOF10: 0 - NO PAIN

## 2024-04-18 NOTE — DISCHARGE SUMMARY
Discharge Diagnosis  Chest pain, unspecified type  Syncope  Vasovagal  Issues Requiring Follow-Up  Routine follow with PCP for uncontrolled GERD symptoms    Discharge Meds     Your medication list        CONTINUE taking these medications        Instructions Last Dose Given Next Dose Due   albuterol 90 mcg/actuation inhaler           atorvastatin 40 mg tablet  Commonly known as: Lipitor           cholecalciferol 25 MCG (1000 UT) capsule  Commonly known as: Vitamin D-3           clopidogrel 75 mg tablet  Commonly known as: Plavix           cyanocobalamin 1,000 mcg tablet  Commonly known as: Vitamin B-12           pantoprazole 40 mg EC tablet  Commonly known as: ProtoNix           solifenacin 5 mg tablet  Commonly known as: VESIcare           ZyrTEC 10 mg capsule  Generic drug: cetirizine                    Test Results Pending At Discharge  Pending Labs       No current pending labs.            Hospital Course   Genevieve Parham is a 74 y.o. female with a past medical history of CAD with history of inferior STEMI (s/p PCI 2016), GERD, GI bleed, COPD (not on home oxygen) and history of COVID-pneumonia.  Patient had a syncopal episode worked up in 2022.  Patient presented to the hospital 4/17 with 2 syncopal/presyncopal episodes in the emergency department.  Patient had a globus sensation in her mid chest area and had had progressively worsening GERD symptoms over the last 2 months.  Patient was sitting in the emergency room was feeling nauseous and had a flush sensation prior to presyncopal episodes with associated chest discomfort that is since resolved.  Patient was evaluated by cardiology team and medicine teams found to have vasovagal syncope secondary to GERD discomfort.  Patient was stable and discharged 4/18 advised to follow-up with PCP    Pertinent Physical Exam At Time of Discharge  Physical Exam  Constitutional: Well developed,  no distress, alert and cooperative  Respiratory/Thorax: Patent airways, CTAB,    Cardiovascular: Regular, rate and rhythm, normal S 1and S 2  Gastrointestinal: Nondistended, soft, non-tender,   Neurological: alert and oriented x3,   MSK: Full range of motion, no tenderness  Psychological: Appropriate mood and behavior  Skin: Warm and dry  Outpatient Follow-Up  Future Appointments   Date Time Provider Department Center   5/17/2024  9:00 AM GAVIOTA Peace, CCC-A [id number] [place]         Mary Anne Riley MD

## 2024-04-18 NOTE — CONSULTS
Inpatient consult to Cardiology  Consult performed by: James C Ramicone, DO  Consult ordered by: Prosper Guthrie MD  Reason for consult: syncope      History Of Present Illness:      This is a 74-year-old female with history of coronary artery disease.  She is being seen today in consultation for evaluation of syncope.  The patient had a brief loss of consciousness yesterday in the emergency room.  She brought in friend above her to be evaluated in the emergency department.  While in a seated position, she became lightheaded, diaphoretic, then had a brief loss of consciousness.  She also reports having a vague substernal chest discomfort which she attributes to GERD.  She has no exertional angina or shortness of breath.  She has had 1 other syncopal event in the past with similar symptoms..    Past medical history:    Coronary artery disease with history of inferior STEMI in 2016  GERD  COPD    Social history: History of tobacco use    Family history: Negative for premature CAD    Review of Systems    Other review of systems negative    Social History:  She reports that she has been smoking cigarettes. She has never used smokeless tobacco. No history on file for alcohol use and drug use.    Family History:  No family history on file.     Allergies:  Patient has no known allergies.    Medications:  Current Facility-Administered Medications   Medication Dose Route Frequency Provider Last Rate Last Admin    aspirin chewable tablet 81 mg  81 mg oral Daily Mary Anne Riley MD        atorvastatin (Lipitor) tablet 40 mg  40 mg oral Nightly Mary Anne Riley MD   40 mg at 04/17/24 2014    enoxaparin (Lovenox) syringe 40 mg  40 mg subcutaneous q24h Mary Anne Riley MD        melatonin tablet 6 mg  6 mg oral Nightly Mary Anne Riley MD        ondansetron (Zofran) injection 4 mg  4 mg intravenous Once Mary Anne Riley MD        oxybutynin (Ditropan) tablet 5 mg  5 mg oral BID Mary Anne Riley MD   5 mg at 04/17/24 2014         Last  "Recorded Vitals:  Vitals:    04/17/24 1657 04/17/24 1700 04/17/24 2128 04/18/24 0527   BP: 115/58 157/79 123/59 136/62   BP Location: Right arm Right arm Right arm Right arm   Patient Position: 1 minute standing 3 minute standing Lying Lying   Pulse: 79 80 72 66   Resp:   15 16   Temp:   36.2 °C (97.2 °F) 36.3 °C (97.3 °F)   TempSrc:   Temporal Temporal   SpO2:   95% 96%   Weight:       Height:           Physical Exam:    General Appearance:  Alert, oriented, no distress  Skin:  Warm and dry  Head and Neck:  No elevation of JVP, no carotid bruits  Cardiac Exam:  Rhythm is regular, S1 and S2 are normal, no murmur S3 or S4  Lungs:  Clear to auscultation  Extremities:  no edema  Neurologic:  No focal deficits  Psychiatric:  Appropriate mood and behavior         Last Labs:  CBC -  Lab Results   Component Value Date    WBC 11.0 04/17/2024    HGB 15.0 04/17/2024    HCT 45.4 04/17/2024    MCV 89 04/17/2024     04/17/2024       CMP -  Lab Results   Component Value Date    CALCIUM 9.0 04/18/2024    PROT 6.9 04/17/2024    ALBUMIN 4.3 04/17/2024    AST 11 04/17/2024    ALT 9 04/17/2024    ALKPHOS 86 04/17/2024    BILITOT 0.6 04/17/2024       LIPID PANEL -   No results found for: \"CHOL\", \"TRIG\", \"HDL\", \"CHHDL\", \"LDLF\", \"VLDL\", \"NHDL\"    RENAL FUNCTION PANEL -   Lab Results   Component Value Date    GLUCOSE 89 04/18/2024     04/18/2024    K 4.0 04/18/2024     04/18/2024    CO2 24 04/18/2024    ANIONGAP 13 04/18/2024    BUN 11 04/18/2024    CREATININE 0.81 04/18/2024    CALCIUM 9.0 04/18/2024    ALBUMIN 4.3 04/17/2024        No results found for: \"BNP\", \"HGBA1C\"          No results found for this or any previous visit.    Test Review:  I have personally review the diagnostic testing:  EKG: Sinus rhythm, no ischemic changes  Telemetry: Normal sinus rhythm with isolated PVCs  Echocardiogram May 2022: Ejection fraction 65% with mild LVH    Assessment/Plan:    1.  Syncope: This patient had a loss of consciousness " yesterday while in the emergency department.  Based on the symptom description, her presentation is consistent with vasovagal syncope.  She has normal left ventricular function by echocardiogram.  The EKG shows sinus rhythm with no ischemic changes and troponins are negative.  She is otherwise asymptomatic from a cardiac standpoint.  She is cleared for discharge today and does not require further cardiac testing at this time.  She should follow-up with Dr. Akin Caputo on an outpatient basis.      James C Ramicone, DO

## 2024-04-18 NOTE — CARE PLAN
Problem: Pain  Goal: My pain/discomfort is manageable  Outcome: Progressing     Problem: Safety  Goal: Patient will be injury free during hospitalization  Outcome: Progressing  Goal: I will remain free of falls  Outcome: Progressing     Problem: Daily Care  Goal: Daily care needs are met  Outcome: Progressing     Problem: Psychosocial Needs  Goal: Demonstrates ability to cope with hospitalization/illness  Outcome: Progressing  Goal: Collaborate with me, my family, and caregiver to identify my specific goals  Outcome: Progressing     Problem: Discharge Barriers  Goal: My discharge needs are met  Outcome: Progressing   The patient's goals for the shift include safety     The clinical goals for the shift include Patient will not have any syncopal episodes during shift

## 2024-05-08 LAB
ATRIAL RATE: 69 BPM
P AXIS: 70 DEGREES
PR INTERVAL: 150 MS
Q ONSET: 252 MS
QRS COUNT: 11 BEATS
QRS DURATION: 96 MS
QT INTERVAL: 414 MS
QTC CALCULATION(BAZETT): 444 MS
QTC FREDERICIA: 434 MS
R AXIS: 39 DEGREES
T AXIS: 39 DEGREES
T OFFSET: 459 MS
VENTRICULAR RATE: 69 BPM

## 2024-05-17 ENCOUNTER — CLINICAL SUPPORT (OUTPATIENT)
Dept: AUDIOLOGY | Facility: CLINIC | Age: 74
End: 2024-05-17
Payer: MEDICARE

## 2024-05-17 DIAGNOSIS — H93.12 TINNITUS OF LEFT EAR: ICD-10-CM

## 2024-05-17 DIAGNOSIS — H90.3 SNHL (SENSORY-NEURAL HEARING LOSS), ASYMMETRICAL: Primary | ICD-10-CM

## 2024-05-17 PROCEDURE — 92550 TYMPANOMETRY & REFLEX THRESH: CPT | Performed by: AUDIOLOGIST

## 2024-05-17 PROCEDURE — 92557 COMPREHENSIVE HEARING TEST: CPT | Performed by: AUDIOLOGIST

## 2024-05-17 NOTE — PROGRESS NOTES
"AUDIOLOGY ADULT AUDIOMETRIC EVALUATION      Name:  Genevieve Parham  :  1950  Age:  74 y.o.  Date of Evaluation:  2024    HISTORY  Reason for visit:  tinnitus  Ms. Parham is seen 24 at the request of  Heri Pablo MD for an evaluation of hearing.      Chief complaint:    - history of tinnitus, left more than right, about 3 months ago (2 days after a noise exposure at a concert); lasted about 1 month and has resolved    Hearing loss:  ok per patient; kids have concerns  Tinnitus:   none at this time  Otitis Media: denies  Otologic surgical history:  denies  Dizziness/imbalance:  denies  Otalgia:  denies  Ear pressure/fullness:  denies  History of excessive noise exposure: some music  Other: none    Hearing aid history: none         EVALUATION  Please find audiogram in \"Media\" tab (Document Type:  Audiology Report) or included at the bottom of this note.    RESULTS   Otoscopic Evaluation: minimal cerumen bilaterally      Immittance Measures (226 Hz probe tone):   Tympanometry is consistent with normal middle ear pressure and normal tympanic membrane mobility bilaterally.       Ipsilateral acoustic reflexes (500-4000 Hz) are  present for the right ear for 500-4000 Hz and present for the left ear for 500-2000 Hz (absent 4000 Hz).      Test technique:  standard behavioral technique via insert earphones.  Reliability is good.    Pure Tone Audiometry:  Hearing sensitivity is in the normal hearing to severe hearing loss range bilaterally.    Note asymmetry for 500-4000 Hz (left worse than right)     Speech Audiometry:        Right Ear:  Speech Reception Threshold (SRT) was obtained at 25 dBHL                 Speech discrimination score was 96% in quiet when words were presented at 75 dBHL      Left Ear:  Speech Reception Threshold (SRT) was obtained at 45 dBHL                 Speech discrimination score was 76% in quiet when words were presented at 85 dBHL  Note speech discrimination score asymmetry (left " worse than right)     IMPRESSIONS:  Note pure-tone and speech discrimination asymmetry (both left worse than right)     Patient is expected to have communication difficulty in adverse listening environments.     Patient is expected to benefit from devices that provide amplification (e.g., hearing aids) and improve the desired sound signal over that of background noise.       RECOMMENDATIONS  Continue with medical follow up with Heri Pablo M.D.  Reassess hearing in 1 year (or sooner if medically indicated or if there is a concern for a change in hearing).    Continue with medical follow-up as indicated.       PATIENT EDUCATION  Discussed results and recommendations with patient.  Questions were addressed and the patient was encouraged to contact our department should concerns arise.       GAVIOTA Peace, [facility]  Licensed Audiologist

## 2024-06-17 ENCOUNTER — HOSPITAL ENCOUNTER (OUTPATIENT)
Dept: RADIOLOGY | Facility: CLINIC | Age: 74
Discharge: HOME | End: 2024-06-17
Payer: MEDICARE

## 2024-06-17 DIAGNOSIS — R91.8 RADIOLOGIC FINDINGS OF LUNG FIELD, ABNORMAL: ICD-10-CM

## 2024-06-17 PROCEDURE — 71250 CT THORAX DX C-: CPT

## 2024-06-17 PROCEDURE — 71250 CT THORAX DX C-: CPT | Performed by: STUDENT IN AN ORGANIZED HEALTH CARE EDUCATION/TRAINING PROGRAM

## 2024-11-15 ENCOUNTER — HOSPITAL ENCOUNTER (OUTPATIENT)
Dept: RADIOLOGY | Facility: HOSPITAL | Age: 74
Discharge: HOME | End: 2024-11-15
Payer: MEDICARE

## 2024-11-15 DIAGNOSIS — R63.4 LOSING WEIGHT: ICD-10-CM

## 2024-11-15 DIAGNOSIS — R10.84 GENERALIZED ABDOMINAL PAIN: ICD-10-CM

## 2024-11-15 LAB
CREAT SERPL-MCNC: 1.2 MG/DL (ref 0.6–1.3)
GFR SERPL CREATININE-BSD FRML MDRD: 47 ML/MIN/1.73M*2

## 2024-11-15 PROCEDURE — 82565 ASSAY OF CREATININE: CPT

## 2024-11-15 PROCEDURE — 2550000001 HC RX 255 CONTRASTS: Performed by: INTERNAL MEDICINE

## 2024-11-15 PROCEDURE — 74177 CT ABD & PELVIS W/CONTRAST: CPT

## 2025-08-07 ENCOUNTER — APPOINTMENT (OUTPATIENT)
Dept: RADIOLOGY | Facility: CLINIC | Age: 75
End: 2025-08-07
Payer: MEDICARE

## 2025-08-07 ENCOUNTER — HOSPITAL ENCOUNTER (OUTPATIENT)
Dept: RADIOLOGY | Facility: CLINIC | Age: 75
Discharge: HOME | End: 2025-08-07
Payer: MEDICARE

## 2025-08-07 DIAGNOSIS — F17.210 MODERATE CIGARETTE SMOKER: ICD-10-CM

## 2025-08-07 PROCEDURE — 71271 CT THORAX LUNG CANCER SCR C-: CPT
